# Patient Record
Sex: MALE | Race: WHITE | URBAN - METROPOLITAN AREA
[De-identification: names, ages, dates, MRNs, and addresses within clinical notes are randomized per-mention and may not be internally consistent; named-entity substitution may affect disease eponyms.]

---

## 2018-05-08 ENCOUNTER — OFFICE VISIT (OUTPATIENT)
Dept: UROLOGY | Facility: CLINIC | Age: 28
End: 2018-05-08
Payer: COMMERCIAL

## 2018-05-08 VITALS
HEART RATE: 84 BPM | WEIGHT: 240 LBS | BODY MASS INDEX: 33.6 KG/M2 | HEIGHT: 71 IN | SYSTOLIC BLOOD PRESSURE: 125 MMHG | DIASTOLIC BLOOD PRESSURE: 84 MMHG

## 2018-05-08 DIAGNOSIS — R39.12 WEAK URINE STREAM: Primary | ICD-10-CM

## 2018-05-08 DIAGNOSIS — K40.91 UNILATERAL RECURRENT INGUINAL HERNIA WITHOUT OBSTRUCTION OR GANGRENE: ICD-10-CM

## 2018-05-08 LAB
ALBUMIN UR-MCNC: NEGATIVE MG/DL
APPEARANCE UR: CLEAR
BILIRUB UR QL STRIP: NEGATIVE
COLOR UR AUTO: YELLOW
GLUCOSE UR STRIP-MCNC: NEGATIVE MG/DL
HGB UR QL STRIP: NEGATIVE
KETONES UR STRIP-MCNC: ABNORMAL MG/DL
LEUKOCYTE ESTERASE UR QL STRIP: NEGATIVE
NITRATE UR QL: NEGATIVE
PH UR STRIP: 5.5 PH (ref 5–7)
SOURCE: ABNORMAL
SP GR UR STRIP: 1.02 (ref 1–1.03)
UROBILINOGEN UR STRIP-ACNC: 0.2 EU/DL (ref 0.2–1)

## 2018-05-08 PROCEDURE — 81003 URINALYSIS AUTO W/O SCOPE: CPT | Performed by: UROLOGY

## 2018-05-08 PROCEDURE — 99203 OFFICE O/P NEW LOW 30 MIN: CPT | Performed by: UROLOGY

## 2018-05-08 RX ORDER — CIPROFLOXACIN 500 MG/1
500 TABLET, FILM COATED ORAL 2 TIMES DAILY
Qty: 60 TABLET | Refills: 0 | Status: SHIPPED | OUTPATIENT
Start: 2018-05-08 | End: 2018-07-30

## 2018-05-08 ASSESSMENT — PAIN SCALES - GENERAL: PAINLEVEL: MILD PAIN (3)

## 2018-05-08 NOTE — LETTER
"5/8/2018       RE: Adrián Mota  [address]  [facility] [address]     Dear Colleague,    Thank you for referring your patient, Adrián Mota, to the [facility] [place] at [facility]. Please see a copy of my visit note below.    [facility]  Main Office: [address]  [address]  [place] [address]       CHIEF COMPLAINT:  Slow urinary stream    HISTORY:   This is a healthy 27-year-old gentleman who is here today with complaints of a slow urinary stream. He says that this is been going on for about 2 years. He went to a clinic on higher loss about 1 year ago and was treated with ciprofloxacin and that actually did improve his urinary stream, but now the stream is back to being very weak. He says that the stream is weak every time he goes to the bathroom. He has no frequency or urgency or nocturia. He has no dysuria. No other symptoms. He also asked me about a \"bulge\" above the left testicle that is nonpainful but he has been noticing recently.      PAST MEDICAL HISTORY: No past medical history on file.    PAST SURGICAL HISTORY: No past surgical history on file.    FAMILY HISTORY: No family history on file.    SOCIAL HISTORY:   Social History   Substance Use Topics     Smoking status: Never Smoker     Smokeless tobacco: Never Used     Alcohol use Not on file        No Known Allergies    No current outpatient prescriptions on file.    Review Of Systems:  Skin: negative  Eyes: negative  Ears/Nose/Throat: negative  Respiratory: No shortness of breath, dyspnea on exertion, cough, or hemoptysis  Cardiovascular: negative  Gastrointestinal: negative  Genitourinary: negative  Musculoskeletal: negative  Neurologic: negative  Psychiatric: negative  Hematologic/Lymphatic/Immunologic: negative  Endocrine: negative      PHYSICAL EXAM:    /84  Pulse 84  Ht 1.803 m (5' 11\")  Wt 108.9 kg (240 lb)  BMI 33.47 kg/m2  General " appearance: In NAD, conversant  HEENT: Normocephalic and atraumatic, anicteric sclera  Cardiovascular: Not examined  Respiratory: normal, non-labored breathing  Gastrointestinal: negative, Abdomen soft, non-tender, and non-distended.   Musculoskeletal: Not Examined  Peripheral Vascular/extremity: No peripheral edema  Skin: Normal temperature, turgor, and texture. No rash  Psychiatric: Appropriate affect, alert and oriented to person, place, and time    Penis: Normal uncircumcised  Scrotal skin: Normal, no lesions  Testicles: Normal to palpation bilaterally. No intratesticular masses bilaterally.  Epididymis: Normal to palpation bilaterally  Initially when he was standing up he had a bulge above the left testicle. On initial exam this felt like a cord lipoma. However, I had him lay down and the bulge went away and in a supine position the spermatic cord is normal. This leads me to think this may be an inguinal hernia  Lymphatic: Normal inguinal lymph nodes  Digital Rectal Exam: I can only palpate the apex of the prostate due to body habitus    Cystoscopy: Not done      PSA:     UA RESULTS:  No results for input(s): COLOR, APPEARANCE, URINEGLC, URINEBILI, URINEKETONE, SG, UBLD, URINEPH, PROTEIN, UROBILINOGEN, NITRITE, LEUKEST, RBCU, WBCU in the last 17732 hours.    Bladder Scan: 42mL postvoid    Other Labs:      Imaging Studies: None      CLINICAL IMPRESSION:   Slow urinary stream, possible left inguinal hernia    PLAN:   We discussed potential etiologies for slow urinary stream. Urethral stricture versus prostatitis is the most likely diagnosis. Previously he improved with a course of ciprofloxacin so I recommended we try that again. I gave him a 30 day course of ciprofloxacin. I will see him back at the end of the course of antibiotic. If there is no improvement at the end of that course we will proceed with a cystoscopy at next visit for further evaluation.    As mentioned in my exam above, there is a possible left  inguinal hernia and he will follow up with general surgery regarding this.      Tony Sommers MD      Again, thank you for allowing me to participate in the care of your patient.      Sincerely,    Tony Sommers MD

## 2018-05-08 NOTE — PROGRESS NOTES
"[facility]  Main Office: [address]  [address]  [place] [address]       CHIEF COMPLAINT:  Slow urinary stream    HISTORY:   This is a healthy 27-year-old gentleman who is here today with complaints of a slow urinary stream. He says that this is been going on for about 2 years. He went to a clinic on higher loss about 1 year ago and was treated with ciprofloxacin and that actually did improve his urinary stream, but now the stream is back to being very weak. He says that the stream is weak every time he goes to the bathroom. He has no frequency or urgency or nocturia. He has no dysuria. No other symptoms. He also asked me about a \"bulge\" above the left testicle that is nonpainful but he has been noticing recently.      PAST MEDICAL HISTORY: No past medical history on file.    PAST SURGICAL HISTORY: No past surgical history on file.    FAMILY HISTORY: No family history on file.    SOCIAL HISTORY:   Social History   Substance Use Topics     Smoking status: Never Smoker     Smokeless tobacco: Never Used     Alcohol use Not on file        No Known Allergies    No current outpatient prescriptions on file.    Review Of Systems:  Skin: negative  Eyes: negative  Ears/Nose/Throat: negative  Respiratory: No shortness of breath, dyspnea on exertion, cough, or hemoptysis  Cardiovascular: negative  Gastrointestinal: negative  Genitourinary: negative  Musculoskeletal: negative  Neurologic: negative  Psychiatric: negative  Hematologic/Lymphatic/Immunologic: negative  Endocrine: negative      PHYSICAL EXAM:    /84  Pulse 84  Ht 1.803 m (5' 11\")  Wt 108.9 kg (240 lb)  BMI 33.47 kg/m2  General appearance: In NAD, conversant  HEENT: Normocephalic and atraumatic, anicteric sclera  Cardiovascular: Not examined  Respiratory: normal, non-labored breathing  Gastrointestinal: negative, Abdomen soft, non-tender, and non-distended.   Musculoskeletal: Not Examined  Peripheral Vascular/extremity: No peripheral " edema  Skin: Normal temperature, turgor, and texture. No rash  Psychiatric: Appropriate affect, alert and oriented to person, place, and time    Penis: Normal uncircumcised  Scrotal skin: Normal, no lesions  Testicles: Normal to palpation bilaterally. No intratesticular masses bilaterally.  Epididymis: Normal to palpation bilaterally  Initially when he was standing up he had a bulge above the left testicle. On initial exam this felt like a cord lipoma. However, I had him lay down and the bulge went away and in a supine position the spermatic cord is normal. This leads me to think this may be an inguinal hernia  Lymphatic: Normal inguinal lymph nodes  Digital Rectal Exam: I can only palpate the apex of the prostate due to body habitus    Cystoscopy: Not done      PSA:     UA RESULTS:  No results for input(s): COLOR, APPEARANCE, URINEGLC, URINEBILI, URINEKETONE, SG, UBLD, URINEPH, PROTEIN, UROBILINOGEN, NITRITE, LEUKEST, RBCU, WBCU in the last 81195 hours.    Bladder Scan: 42mL postvoid    Other Labs:      Imaging Studies: None      CLINICAL IMPRESSION:   Slow urinary stream, possible left inguinal hernia    PLAN:   We discussed potential etiologies for slow urinary stream. Urethral stricture versus prostatitis is the most likely diagnosis. Previously he improved with a course of ciprofloxacin so I recommended we try that again. I gave him a 30 day course of ciprofloxacin. I will see him back at the end of the course of antibiotic. If there is no improvement at the end of that course we will proceed with a cystoscopy at next visit for further evaluation.    As mentioned in my exam above, there is a possible left inguinal hernia and he will follow up with general surgery regarding this.      Tony Sommers MD

## 2018-05-08 NOTE — LETTER
Date:May 9, 2018      Patient was self referred, no letter generated. Do not send.        [facility] Health Information

## 2018-05-08 NOTE — MR AVS SNAPSHOT
"              After Visit Summary   5/8/2018    Adrián Mota    MRN: [id number]           Patient Information     Date Of Birth          1990        Visit Information        Provider Department      5/8/2018 9:30 AM Tony Sommers MD [facility] Urology [facility]        Today's Diagnoses     Weak urine stream    -  1    Unilateral recurrent inguinal hernia without obstruction or gangrene           Follow-ups after your visit        Follow-up notes from your care team     Return in about 1 month (around 6/8/2018) for possible cysto, also needs referral to surgical consultants re:hernia.      Your next 10 appointments already scheduled     Jun 12, 2018 10:00 AM CDT   Cystoscopy with Tony Sommers MD, UA CYF   [facility] Urology [facility] [place]    [address]  [address]  [facility] [address] [phone]              Who to contact     If you have questions or need follow up information about today's clinic visit or your schedule please contact [facility] directly at [phone].  Normal or non-critical lab and imaging results will be communicated to you by HubCasthart, letter or phone within 4 business days after the clinic has received the results. If you do not hear from us within 7 days, please contact the clinic through HubCasthart or phone. If you have a critical or abnormal lab result, we will notify you by phone as soon as possible.  Submit refill requests through Dodreams or call your pharmacy and they will forward the refill request to us. Please allow 3 business days for your refill to be completed.          Additional Information About Your Visit        MyChart Information     Dodreams lets you send messages to your doctor, view your test results, renew your prescriptions, schedule appointments and more. To sign up, go to [email or web address] . Click on \"Log in\" on the left " "side of the screen, which will take you to the Welcome page. Then click on \"Sign up Now\" on the right side of the page.     You will be asked to enter the access code listed below, as well as some personal information. Please follow the directions to create your username and password.     Your access code is: [email or web address]  Expires: 2018 10:20 AM     Your access code will  in 90 days. If you need help or a new code, please call your [facility] or [phone].        Care EveryWhere ID     This is your Care EveryWhere ID. This could be used by other organizations to access your [place] medical records  [id number]        Your Vitals Were     Pulse Height BMI (Body Mass Index)             84 1.803 m (5' 11\") 33.47 kg/m2          Blood Pressure from Last 3 Encounters:   18 125/84    Weight from Last 3 Encounters:   18 108.9 kg (240 lb)              We Performed the Following     UA without Microscopic [[id number]]        Primary Care Provider Fax #    Physician No Ref-Primary [phone]       No address on file        Equal Access to Services     [facility] : Hadii silverio stephenso Solula, waaxda lusolangeadaha, qaybta kaalmada adequinn, louann mathur . So [facility] [phone].    ATENCIÓN: Si habla español, tiene a seals disposición servicios gratuitos de asistencia lingüística. Sarthakame al [phone].    We comply with applicable federal civil rights laws and [place] laws. We do not discriminate on the basis of race, color, national origin, age, disability, sex, sexual orientation, or gender identity.            Thank you!     Thank you for choosing [facility] [place]  for your care. Our goal is always to provide you with excellent care. Hearing back from our patients is one way we can continue to improve our services. Please take a few minutes to complete the written survey that you may receive in the mail after your visit with us. Thank " you!             Your Updated Medication List - Protect others around you: Learn how to safely use, store and throw away your medicines at www.disposemymeds.org.      Notice  As of 5/8/2018 10:20 AM    You have not been prescribed any medications.

## 2018-06-12 ENCOUNTER — OFFICE VISIT (OUTPATIENT)
Dept: UROLOGY | Facility: CLINIC | Age: 28
End: 2018-06-12
Payer: COMMERCIAL

## 2018-06-12 VITALS
BODY MASS INDEX: 33.6 KG/M2 | DIASTOLIC BLOOD PRESSURE: 84 MMHG | WEIGHT: 240 LBS | SYSTOLIC BLOOD PRESSURE: 142 MMHG | HEIGHT: 71 IN

## 2018-06-12 DIAGNOSIS — R39.198 SLOWING OF URINARY STREAM: Primary | ICD-10-CM

## 2018-06-12 DIAGNOSIS — N39.43 POST-VOID DRIBBLING: Primary | ICD-10-CM

## 2018-06-12 DIAGNOSIS — R39.198 SLOWING OF URINARY STREAM: ICD-10-CM

## 2018-06-12 LAB
ALBUMIN UR-MCNC: ABNORMAL MG/DL
APPEARANCE UR: CLEAR
BILIRUB UR QL STRIP: NEGATIVE
COLOR UR AUTO: YELLOW
GLUCOSE UR STRIP-MCNC: NEGATIVE MG/DL
HGB UR QL STRIP: NEGATIVE
KETONES UR STRIP-MCNC: NEGATIVE MG/DL
LEUKOCYTE ESTERASE UR QL STRIP: NEGATIVE
NITRATE UR QL: NEGATIVE
PH UR STRIP: 6 PH (ref 5–7)
SOURCE: ABNORMAL
SP GR UR STRIP: >1.03 (ref 1–1.03)
UROBILINOGEN UR STRIP-ACNC: 0.2 EU/DL (ref 0.2–1)

## 2018-06-12 PROCEDURE — 52000 CYSTOURETHROSCOPY: CPT | Performed by: UROLOGY

## 2018-06-12 PROCEDURE — 81003 URINALYSIS AUTO W/O SCOPE: CPT | Performed by: UROLOGY

## 2018-06-12 ASSESSMENT — PAIN SCALES - GENERAL: PAINLEVEL: NO PAIN (0)

## 2018-06-12 NOTE — NURSING NOTE
Invasive Procedure Safety Checklist:    Procedure:     Action: Complete sections and checkboxes as appropriate.    Pre-procedure:  1. Patient ID Verified with 2 identifiers (Tia and  or MRN) : YES    2. Procedure and site verified with patient/designee (when able) : YES    3. Accurate consent documentation in medical record : YES    4. H&P (or appropriate assessment) documented in medical record : YES  H&P must be up to 30 days prior to procedure an updated within 24 hours of                 Procedure as applicable.     5. Relevant diagnostic and radiology test results appropriately labeled and displayed as applicable : YES    6. Blood products, implants, devices, and/or special equipment available for the procedure as applicable : YES    7. Procedure site(s) marked with provider initials [Exclusions: N/A] : NO    8. Marking not required. Reason : Yes  Procedure does not require site marking    Time Out:     Time-Out performed immediately prior to starting procedure, including verbal and active participation of all team members addressing: YES    1. Correct patient identity.  2. Confirmed that the correct side and site are marked.  3. An accurate procedure to be done.  4. Agreement on the procedure to be done.  5. Correct patient position.  6. Relevant images and results are properly labeled and appropriately displayed.  7. The need to administer antibiotics or fluids for irrigation purposes during the procedure as applicable.  8. Safety precautions based on patient history or medication use.    During Procedure: Verification of correct person, site, and procedure occurs any time the responsibility for care of the patient is transferred to another member of the care team.    The following medication was given:     MEDICATION: Uro-jet  ROUTE: Urethral meatus   SITE: Urethra   DOSE: 20 mL  LOT #: [id number]  :  International medication systems, limited   EXPIRATION DATE:    NDC#: [address]-9

## 2018-06-12 NOTE — PROGRESS NOTES
"Office Visit Note  [facility]  [phone]    UROLOGIC DIAGNOSES:   Terminal dribbling    CURRENT INTERVENTIONS:       HISTORY:   Adrián returns to clinic today for follow-up. He reports no change in his symptoms from the ciprofloxacin. I asked him about his symptoms again today and he reports mainly terminal dribbling. He says he occasionally has a sensation of incomplete emptying. I discussed with him the fact that some terminal dribbling is normal in men. I think that a cystoscopy would be optional at this point. He would like to proceed with a cystoscopy today so we prepped him for this today.      PAST MEDICAL HISTORY: No past medical history on file.    PAST SURGICAL HISTORY: No past surgical history on file.    FAMILY HISTORY: No family history on file.    SOCIAL HISTORY:   Social History   Substance Use Topics     Smoking status: Never Smoker     Smokeless tobacco: Never Used     Alcohol use Not on file       Current Outpatient Prescriptions   Medication     ciprofloxacin (CIPRO) 500 MG tablet     No current facility-administered medications for this visit.          PHYSICAL EXAM:    /84  Ht 1.803 m (5' 11\")  Wt 108.9 kg (240 lb)  BMI 33.47 kg/m2    HEENT: Normocephalic and atraumatic   Cardiac: Not done  Back/Flank: Not done  CNS/PNS: Not done  Respiratory: Normal non-labored breathing  Abdomen: Soft nontender and nondistended  Peripheral Vascular: Not done  Mental Status: Not done    Penis: Not done  Scrotal Skin: Not done  Testicles: Not done  Epididymis: Not done  Digital Rectal Exam:     Cystoscopy: I performed flexible cystoscopy today and bladder and urethra were normal throughout. No tumors identified throughout the bladder. The prostate was small and nonobstructing. The urethra was normal throughout with no strictures.    Imaging: None    Urinalysis: UA RESULTS:  Recent Labs   Lab Test  05/08/18   1000   COLOR  Yellow   APPEARANCE  Clear   URINEGLC  Negative   URINEBILI  " Negative   URINEKETONE  Trace*   SG  1.025   UBLD  Negative   URINEPH  5.5   PROTEIN  Negative   UROBILINOGEN  0.2   NITRITE  Negative   LEUKEST  Negative       PSA:     Post Void Residual:     Other labs: None today      IMPRESSION:  Normal urologic evaluation    PLAN:  His cystoscopy was normal today. No strictures or other causes of urinary symptoms were discovered. He was provided reassurance. He was counseled that some degree of terminal dribbling is normal at the end of a urinary stream. He will follow-up with me as needed in the future.    Total Time: 15 minutes                                      Total in Consultation: 10 minutes      Tony Sommers M.D.

## 2018-06-12 NOTE — MR AVS SNAPSHOT
"              After Visit Summary   6/12/2018    Adrián Mota    MRN: [id number]           Patient Information     Date Of Birth          1990        Visit Information        Provider Department      6/12/2018 10:00 AM Tony Sommers MD; UA [facility] [place]        Today's Diagnoses     Post-void dribbling    -  1      Care Instructions         AFTER YOUR CYSTOSCOPY         You have just completed a cystoscopy, or \"cysto\", which allowed your physician to learn more about your bladder (or to remove a stent placed after surgery). We suggest that you continue to avoid caffeine, fruit juice, and alcohol for the next 24 hours, however, you are encouraged to return to your normal activities.       A few things that are considered normal after your cystoscopy:    * small amount of bleeding (or spotting) that clears within the next 24 hours    * slight burning sensation with urination    * sensation to of needing to avoid more frequently    * the feeling of \"air\" in your urine    * mild discomfort that is relieved with Tylonol        Please contact our office promptly if you:    * develop a fever above 101 degrees    * are unable to urinate    * develop bright red blood that does not stop    * severe pain or swelling        And of course, please contact our office with any concerns or questions [phone]                Follow-ups after your visit        Follow-up notes from your care team     Return if symptoms worsen or fail to improve.      Future tests that were ordered for you today     Open Future Orders        Priority Expected Expires Ordered    UA without Microscopic Routine  6/12/2019 6/12/2018            Who to contact     If you have questions or need follow up information about today's clinic visit or your schedule please contact [facility] [place] directly at [phone].  Normal or non-critical lab and imaging results " "will be communicated to you by MyChart, letter or phone within 4 business days after the clinic has received the results. If you do not hear from us within 7 days, please contact the clinic through Ticket ABC or phone. If you have a critical or abnormal lab result, we will notify you by phone as soon as possible.  Submit refill requests through Ticket ABC or call your pharmacy and they will forward the refill request to us. Please allow 3 business days for your refill to be completed.          Additional Information About Your Visit        Ticket ABC Information     Ticket ABC lets you send messages to your doctor, view your test results, renew your prescriptions, schedule appointments and more. To sign up, go to [email or web address] . Click on \"Log in\" on the left side of the screen, which will take you to the Welcome page. Then click on \"Sign up Now\" on the right side of the page.     You will be asked to enter the access code listed below, as well as some personal information. Please follow the directions to create your username and password.     Your access code is: [email or web address]  Expires: 2018 10:20 AM     Your access code will  in 90 days. If you need help or a new code, please call your [place] clinic or [phone].        Care EveryWhere ID     This is your Care EveryWhere ID. This could be used by other organizations to access your [place] medical records  [id number]        Your Vitals Were     Height BMI (Body Mass Index)                1.803 m (5' 11\") 33.47 kg/m2           Blood Pressure from Last 3 Encounters:   18 142/84   18 125/84    Weight from Last 3 Encounters:   18 108.9 kg (240 lb)   18 108.9 kg (240 lb)              Today, you had the following     No orders found for display       Primary Care Provider Fax #    Physician No Ref-Primary [phone]       No address on file        Equal Access to Services     DOREEN MONTES : Hadii phylicia Rivera " tabatha ferrermaoz cameronanabellabbie clintoninna singhin hayaan adeeg kharash la'aan ah. So [facility] [phone].    ATENCIÓN: Si gerardo gruber, tiene a seals disposición servicios gratuitos de asistencia lingüística. Llame al [phone].    We comply with applicable federal civil rights laws and [place] laws. We do not discriminate on the basis of race, color, national origin, age, disability, sex, sexual orientation, or gender identity.            Thank you!     Thank you for choosing [facility] [place]  for your care. Our goal is always to provide you with excellent care. Hearing back from our patients is one way we can continue to improve our services. Please take a few minutes to complete the written survey that you may receive in the mail after your visit with us. Thank you!             Your Updated Medication List - Protect others around you: Learn how to safely use, store and throw away your medicines at www.disposemymeds.org.          This list is accurate as of 6/12/18 10:51 AM.  Always use your most recent med list.                   Brand Name Dispense Instructions for use Diagnosis    ciprofloxacin 500 MG tablet    CIPRO    60 tablet    Take 1 tablet (500 mg) by mouth 2 times daily    Weak urine stream

## 2018-06-12 NOTE — PATIENT INSTRUCTIONS
"     AFTER YOUR CYSTOSCOPY         You have just completed a cystoscopy, or \"cysto\", which allowed your physician to learn more about your bladder (or to remove a stent placed after surgery). We suggest that you continue to avoid caffeine, fruit juice, and alcohol for the next 24 hours, however, you are encouraged to return to your normal activities.       A few things that are considered normal after your cystoscopy:    * small amount of bleeding (or spotting) that clears within the next 24 hours    * slight burning sensation with urination    * sensation to of needing to avoid more frequently    * the feeling of \"air\" in your urine    * mild discomfort that is relieved with Tylonol        Please contact our office promptly if you:    * develop a fever above 101 degrees    * are unable to urinate    * develop bright red blood that does not stop    * severe pain or swelling        And of course, please contact our office with any concerns or questions [phone]        "

## 2018-06-12 NOTE — LETTER
"6/12/2018       RE: Adrián Mota  [address]  [facility] [address]     Dear Colleague,    Thank you for referring your patient, Adrián Mota, to the [facility] at [facility]. Please see a copy of my visit note below.    Office Visit Note  [facility]  [phone]    UROLOGIC DIAGNOSES:   Terminal dribbling    CURRENT INTERVENTIONS:       HISTORY:   Adrián returns to clinic today for follow-up. He reports no change in his symptoms from the ciprofloxacin. I asked him about his symptoms again today and he reports mainly terminal dribbling. He says he occasionally has a sensation of incomplete emptying. I discussed with him the fact that some terminal dribbling is normal in men. I think that a cystoscopy would be optional at this point. He would like to proceed with a cystoscopy today so we prepped him for this today.      PAST MEDICAL HISTORY: No past medical history on file.    PAST SURGICAL HISTORY: No past surgical history on file.    FAMILY HISTORY: No family history on file.    SOCIAL HISTORY:   Social History   Substance Use Topics     Smoking status: Never Smoker     Smokeless tobacco: Never Used     Alcohol use Not on file       Current Outpatient Prescriptions   Medication     ciprofloxacin (CIPRO) 500 MG tablet     No current facility-administered medications for this visit.          PHYSICAL EXAM:    /84  Ht 1.803 m (5' 11\")  Wt 108.9 kg (240 lb)  BMI 33.47 kg/m2    HEENT: Normocephalic and atraumatic   Cardiac: Not done  Back/Flank: Not done  CNS/PNS: Not done  Respiratory: Normal non-labored breathing  Abdomen: Soft nontender and nondistended  Peripheral Vascular: Not done  Mental Status: Not done    Penis: Not done  Scrotal Skin: Not done  Testicles: Not done  Epididymis: Not done  Digital Rectal Exam:     Cystoscopy: I performed flexible cystoscopy today and bladder and urethra were normal " throughout. No tumors identified throughout the bladder. The prostate was small and nonobstructing. The urethra was normal throughout with no strictures.    Imaging: None    Urinalysis: UA RESULTS:  Recent Labs   Lab Test  05/08/18   1000   COLOR  Yellow   APPEARANCE  Clear   URINEGLC  Negative   URINEBILI  Negative   URINEKETONE  Trace*   SG  1.025   UBLD  Negative   URINEPH  5.5   PROTEIN  Negative   UROBILINOGEN  0.2   NITRITE  Negative   LEUKEST  Negative       PSA:     Post Void Residual:     Other labs: None today      IMPRESSION:  Normal urologic evaluation    PLAN:  His cystoscopy was normal today. No strictures or other causes of urinary symptoms were discovered. He was provided reassurance. He was counseled that some degree of terminal dribbling is normal at the end of a urinary stream. He will follow-up with me as needed in the future.    Total Time: 15 minutes                                      Total in Consultation: 10 minutes      Tony Sommers M.D.              Again, thank you for allowing me to participate in the care of your patient.      Sincerely,    Tony Sommers MD

## 2018-07-30 ENCOUNTER — OFFICE VISIT (OUTPATIENT)
Dept: SURGERY | Facility: CLINIC | Age: 28
End: 2018-07-30
Payer: COMMERCIAL

## 2018-07-30 VITALS
WEIGHT: 240 LBS | HEART RATE: 82 BPM | DIASTOLIC BLOOD PRESSURE: 100 MMHG | HEIGHT: 71 IN | SYSTOLIC BLOOD PRESSURE: 148 MMHG | BODY MASS INDEX: 33.6 KG/M2

## 2018-07-30 DIAGNOSIS — R10.32 LEFT GROIN PAIN: Primary | ICD-10-CM

## 2018-07-30 PROCEDURE — 99203 OFFICE O/P NEW LOW 30 MIN: CPT | Performed by: SURGERY

## 2018-07-30 NOTE — LETTER
"2018    Re: Adrián Mota - 1990    HISTORY OF PRESENT ILLNESS:  Adrián Mota is a 28 year old male who is seen in consultation at the request of Dr. Sommers for evaluation of left groin pain.  Some time over 6 months ago while doing some heavy/strenuous activity Mr. Mota felt some discomfort on his left groin.  No gastrointestinal difficulties were related to it.  Now he does not have particular pain but the left groin feels sore and full after sitting for extended periods of time.  No previous groin procedures.     REVIEW OF SYSTEMS:  Constitutional:  Negative for chills, fatigue, fever and weight change.  Neuro: No extremity, nor facial weakness  Psych:  No unexpected changes in mood  Eyes:  Negative for new vision problems.  ENT:  Negative for ENT pain.  Cardiovascular:  Negative for chest pain, palpitations.  Respiratory:  Negative for cough, dyspnea.  Gastrointestinal:  Negative for abdominal pain.    Musculoskeletal:  Negative for new arthralgias or myalgias.  Integumentary:  No skin changes or rashes, left scrotal fullness      No past medical history.     No past surgical history.      Family History has been reviewed.      Vitals: BP (!) 148/100  Pulse 82  Ht 5' 11\" (1.803 m)  Wt 240 lb (108.9 kg)  BMI 33.47 kg/m2  BMI= Body mass index is 33.47 kg/(m^2).     EXAM:  GENERAL: healthy, alert and no distress      HEENT: moist mucus membranes, no scleral icterus   CARDIOVASCULAR:  RRR, No JVD  NEURO:  Alert;  well oriented to time, place and person.  RESPIRATORY: non labored breathing  NECK: Neck supple. No noticeable masses.  No lymphadenopathy noted.  ABDOMEN/GI: soft, nontender, nondistended, within the limitations of his body habitus I do not feel any hernia on either groin.  I can appreciate the fullness he refers to on the lateral aspect of the left scrotum.  This feels soft and compressible. Not particularly tender.  EXTREMITIES: warm and well perfused, no edema  SKIN: No " suspicious lesions or rashes  LYMPH: Normal inguinal lymph nodes     LABS/Imaging: none at this time     ASSESSMENT:  Adrián Mota suffers from 1. Left groin pain     - US Extremity Non Vascular Left; Future  - US Testicular & Scrotum w Doppler Ltd; Future      PLAN:  Given the lack of physical findings which could be due to his body habitus we discussed the option of imaging studies versus observation versus laparoscopic exploration.  We will start with ultrasound evaluation of his left groin and scrotum.  Pending results and or signs and symptoms evolution laparoscopic exploration and or repair could be undertaken.     He understands and agrees with this plan.     It is my pleasure to participate in the care of Adrián Mota. Thank you for this consultation.      If you have any questions please give me a call.     Best regards,  Anatoly Meyers MD

## 2018-07-30 NOTE — PATIENT INSTRUCTIONS
Patient to follow up with Primary Care provider regarding elevated blood pressure.    Your left groin and testicular ultrasound are scheduled for 7/31/18 2:40 pm at [facility]

## 2018-07-30 NOTE — MR AVS SNAPSHOT
After Visit Summary   7/30/2018    Adrián Mota    MRN: [id number]           Patient Information     Date Of Birth          1990        Visit Information        Provider Department      7/30/2018 12:30 PM Anatoly Meyers MD [facility] General Surgery      Today's Diagnoses     Left groin pain    -  1      Care Instructions    Patient to follow up with Primary Care provider regarding elevated blood pressure.    Your left groin and testicular ultrasound are scheduled for 7/31/18 2:40 pm at [facility]          Follow-ups after your visit        Your next 10 appointments already scheduled     Jul 31, 2018  2:40 PM CDT   US TESTICULAR AND SCROTUM WITH DOPPLER LIMITED with SHUS2   [facility] ([facility])    [address] [place] [id number] [phone]           Please bring a list of your medicines (including vitamins, minerals and over-the-counter drugs). Also, tell your doctor about any allergies you may have. Wear comfortable clothes and leave your valuables at home.  You do not need to do anything special to prepare for your exam.  Please call the Imaging Department at your exam site with any questions.            Jul 31, 2018  3:10 PM CDT   US EXTREMITY NON VASCULAR LEFT with SHUS2   [facility] ([facility])    [facility] [facility] [id number] [phone]           Please bring a list of your medicines (including vitamins, minerals and over-the-counter drugs). Also, tell your doctor about any allergies you may have. Wear comfortable clothes and leave your valuables at home.  You do not need to do anything special to prepare for your exam.  Please call the Imaging Department at your exam site with any questions.              Future tests that were ordered for you today     Open Future Orders        Priority Expected Expires Ordered     "US Extremity Non Vascular Left Routine 2018    US Testicular & Scrotum w Doppler Ltd Routine 2018            Who to contact     If you have questions or need follow up information about today's clinic visit or your schedule please contact [facility] directly at [phone].  Normal or non-critical lab and imaging results will be communicated to you by Zeetlhart, letter or phone within 4 business days after the clinic has received the results. If you do not hear from us within 7 days, please contact the clinic through Zeetlhart or phone. If you have a critical or abnormal lab result, we will notify you by phone as soon as possible.  Submit refill requests through Healthiest You or call your pharmacy and they will forward the refill request to us. Please allow 3 business days for your refill to be completed.          Additional Information About Your Visit        ZeetlharNVMdurance Information     Healthiest You lets you send messages to your doctor, view your test results, renew your prescriptions, schedule appointments and more. To sign up, go to [email or web address] . Click on \"Log in\" on the left side of the screen, which will take you to the Welcome page. Then click on \"Sign up Now\" on the right side of the page.     You will be asked to enter the access code listed below, as well as some personal information. Please follow the directions to create your username and password.     Your access code is: [email or web address]  Expires: 2018 10:20 AM     Your access code will  in 90 days. If you need help or a new code, please call your [facility] clinic or [phone].        Care EveryWhere ID     This is your Care EveryWhere ID. This could be used by other organizations to access your [facility] medical records  [id number]        Your Vitals Were     Pulse Height BMI (Body Mass Index)             82 5' 11\" (1.803 m) 33.47 kg/m2          Blood Pressure from Last 3 Encounters: "   07/30/18 (!) 148/100   06/12/18 142/84   05/08/18 125/84    Weight from Last 3 Encounters:   07/30/18 240 lb (108.9 kg)   06/12/18 240 lb (108.9 kg)   05/08/18 240 lb (108.9 kg)                 Today's Medication Changes          These changes are accurate as of 7/30/18 12:56 PM.  If you have any questions, ask your nurse or doctor.               Stop taking these medicines if you haven't already. Please contact your care team if you have questions.     ciprofloxacin 500 MG tablet   Commonly known as:  CIPRO   Stopped by:  Anatoly Meyers MD                    Primary Care Provider Fax #    Physician No Ref-Primary [phone]       No address on file        Equal Access to Services     DOREEN MONTES : Sameera Gilbert, wafernando ferrer, qaybta kaalmaoz lawton, louann chopra. So [facility] [phone].    ATENCIÓN: Si habla español, tiene a seals disposición servicios gratuitos de asistencia lingüística. Llame al [phone].    We comply with applicable federal civil rights laws and [place] laws. We do not discriminate on the basis of race, color, national origin, age, disability, sex, sexual orientation, or gender identity.            Thank you!     Thank you for choosing [facility]  for your care. Our goal is always to provide you with excellent care. Hearing back from our patients is one way we can continue to improve our services. Please take a few minutes to complete the written survey that you may receive in the mail after your visit with us. Thank you!             Your Updated Medication List - Protect others around you: Learn how to safely use, store and throw away your medicines at www.disposemymeds.org.      Notice  As of 7/30/2018 12:56 PM    You have not been prescribed any medications.

## 2018-07-31 ENCOUNTER — HOSPITAL ENCOUNTER (OUTPATIENT)
Dept: ULTRASOUND IMAGING | Facility: CLINIC | Age: 28
Discharge: HOME | End: 2018-07-31
Attending: SURGERY | Admitting: SURGERY

## 2018-07-31 DIAGNOSIS — R10.32 LEFT GROIN PAIN: ICD-10-CM

## 2018-07-31 PROCEDURE — 76870 US EXAM SCROTUM: CPT

## 2018-07-31 NOTE — PROGRESS NOTES
"[facility] General Surgery Clinic Consultation    CHIEF COMPLAINT:  Chief Complaint   Patient presents with     Consult     [facility]       HISTORY OF PRESENT ILLNESS:  Adrián Mota is a 28 year old male who is seen in consultation at the request of Dr. Sommers for evaluation of left groin pain.  Some time over 6 months ago while doing some heavy/strenuous activity Mr. Mota felt some discomfort on his left groin.  No gastrointestinal difficulties were related to it.  Now he does not have particular pain but the left groin feels sore and full after sitting for extended periods of time.  No previous groin procedures.    REVIEW OF SYSTEMS:  Constitutional:  Negative for chills, fatigue, fever and weight change.  Neuro: No extremity, nor facial weakness  Psych:  No unexpected changes in mood  Eyes:  Negative for new vision problems.  ENT:  Negative for ENT pain.  Cardiovascular:  Negative for chest pain, palpitations.  Respiratory:  Negative for cough, dyspnea.  Gastrointestinal:  Negative for abdominal pain.    Musculoskeletal:  Negative for new arthralgias or myalgias.  Integumentary:  No skin changes or rashes, left scrotal fullness     No past medical history.    No past surgical history.     Family History has been reviewed.    Social History   Substance Use Topics     Smoking status: Never Smoker     Smokeless tobacco: Never Used     Alcohol use Not on file       There is no problem list on file for this patient.      No Known Allergies    No current outpatient prescriptions on file.       Vitals: BP (!) 148/100  Pulse 82  Ht 5' 11\" (1.803 m)  Wt 240 lb (108.9 kg)  BMI 33.47 kg/m2  BMI= Body mass index is 33.47 kg/(m^2).    EXAM:  GENERAL: healthy, alert and no distress     HEENT: moist mucus membranes, no scleral icterus   CARDIOVASCULAR:  RRR, No JVD  NEURO:  Alert;  well oriented to time, place and person.  RESPIRATORY: non labored breathing  NECK: Neck supple. No noticeable masses.  No lymphadenopathy " noted.  ABDOMEN/GI: soft, nontender, nondistended, within the limitations of his body habitus I do not feel any hernia on either groin.  I can appreciate the fullness he refers to on the lateral aspect of the left scrotum.  This feels soft and compressible. Not particularly tender.  EXTREMITIES: warm and well perfused, no edema  SKIN: No suspicious lesions or rashes  LYMPH: Normal inguinal lymph nodes    LABS/Imaging: none at this time    ASSESSMENT:  Adrián Mota suffers from 1. Left groin pain    - US Extremity Non Vascular Left; Future  - US Testicular & Scrotum w Doppler Ltd; Future      PLAN:  Given the lack of physical findings which could be due to his body habitus we discussed the option of imaging studies versus observation versus laparoscopic exploration.  We will start with ultrasound evaluation of his left groin and scrotum.  Pending results and or signs and symptoms evolution laparoscopic exploration and or repair could be undertaken.    He understands and agrees with this plan.    It is my pleasure to participate in the care of Adrián Mota. Thank you for this consultation.     If you have any questions please give me a call.    Best regards,  Anatoly Meyers MD    Please route or send letter to:  Primary Care Provider (PCP), Referring Provider and Include Progress Note    Total time with patient visit: 30 minutes more than half spent in counseling, explanation of procedures and coordination of care.

## 2018-08-02 ENCOUNTER — TELEPHONE (OUTPATIENT)
Dept: SURGERY | Facility: CLINIC | Age: 28
End: 2018-08-02

## 2018-08-02 NOTE — TELEPHONE ENCOUNTER
Attempted to call patient to discuss U/S results.  No answer.  Left call back number for patient to call.    IMPRESSION:  1. Multiple left varicoceles noted at the site of patient's bulging.  No right varicoceles.  2. No hernias bilaterally.    Per LEL, patient should go back to Dr. Sommers (Urologist) as U/S shows varicoceles as discussed during appointment.    Michelle Burr RN

## 2018-08-02 NOTE — TELEPHONE ENCOUNTER
Returned patient's call and informed him that U/S shows varicoceles in his left testicle.  No evidence of hernia.  He should follow up with urologist.    Copy of Ultrasound report sent to patient.    He verbalized understanding and agreed.    Michelle Burr RN

## 2018-08-21 ENCOUNTER — PRE VISIT (OUTPATIENT)
Dept: UROLOGY | Facility: CLINIC | Age: 28
End: 2018-08-21

## 2018-08-21 NOTE — TELEPHONE ENCOUNTER
Patient with history of varicocele coming in for consult with Dr. Jaramillo. Patient chart reviewed, no need for call, all records available and ready for appointment.

## 2018-08-30 ENCOUNTER — OFFICE VISIT (OUTPATIENT)
Dept: UROLOGY | Facility: CLINIC | Age: 28
End: 2018-08-30
Payer: COMMERCIAL

## 2018-08-30 VITALS
DIASTOLIC BLOOD PRESSURE: 94 MMHG | HEART RATE: 84 BPM | HEIGHT: 71 IN | SYSTOLIC BLOOD PRESSURE: 154 MMHG | WEIGHT: 240 LBS | BODY MASS INDEX: 33.6 KG/M2

## 2018-08-30 DIAGNOSIS — N50.819 PAIN OF TESTES: ICD-10-CM

## 2018-08-30 DIAGNOSIS — I86.1 LEFT VARICOCELE: Primary | ICD-10-CM

## 2018-08-30 ASSESSMENT — PAIN SCALES - GENERAL: PAINLEVEL: NO PAIN (0)

## 2018-08-30 NOTE — MR AVS SNAPSHOT
"              After Visit Summary   8/30/2018    Adrián Mota    MRN: [id number]           Patient Information     Date Of Birth          1990        Visit Information        Provider Department      8/30/2018 10:40 AM Kirit Jaramillo MD [facility] Urology and [facility]        Today's Diagnoses     Left varicocele    -  1    Pain of testes           Follow-ups after your visit        Future tests that were ordered for you today     Open Future Orders        Priority Expected Expires Ordered    IR Consultation for IR Exam Routine  8/30/2019 8/30/2018            Who to contact     Please call your clinic at [phone] to:    Ask questions about your health    Make or cancel appointments    Discuss your medicines    Learn about your test results    Speak to your doctor            Additional Information About Your Visit        Care EveryWhere ID     This is your Care EveryWhere ID. This could be used by other organizations to access your [facility] medical records  [id number]        Your Vitals Were     Pulse Height BMI (Body Mass Index)             84 1.803 m (5' 11\") 33.47 kg/m2          Blood Pressure from Last 3 Encounters:   08/30/18 (!) 154/94   07/30/18 (!) 148/100   06/12/18 142/84    Weight from Last 3 Encounters:   08/30/18 108.9 kg (240 lb)   07/30/18 108.9 kg (240 lb)   06/12/18 108.9 kg (240 lb)               Primary Care Provider Fax #    Physician No Ref-Primary [phone]       No address on file        Equal Access to Services     CARIE [facility] : Hadii aad ku hadasho Soomaali, waaxda luqadaha, qaybta kaalmada adeegyada, louann canales hayelizabeth mathur . So [facility] [phone].    ATENCIÓN: Si habla español, tiene a seals disposición servicios gratuitos de asistencia lingüística. Llame al [phone].    We comply with applicable federal civil rights laws and [place] laws. We do not discriminate on the basis of race, color, national origin, age, disability, " sex, sexual orientation, or gender identity.            Thank you!     Thank you for choosing [facility] UROLOGY AND [facility] FOR PROSTATE AND UROLOGIC CANCERS  for your care. Our goal is always to provide you with excellent care. Hearing back from our patients is one way we can continue to improve our services. Please take a few minutes to complete the written survey that you may receive in the mail after your visit with us. Thank you!             Your Updated Medication List - Protect others around you: Learn how to safely use, store and throw away your medicines at www.disposemymeds.org.      Notice  As of 8/30/2018  2:12 PM    You have not been prescribed any medications.       none

## 2018-08-30 NOTE — LETTER
"8/30/2018       RE: Adrián Mota  [address]  [place] [address]     Dear Colleague,    Thank you for referring your patient, Adrián Mota, to the [facility] UROLOGY AND INST FOR PROSTATE AND UROLOGIC CANCERS at [facility]. Please see a copy of my visit note below.    I am seeing Adrián Mota in consultation from Anatoly Meyers for evaluation of left varicocele.    HPI:  Adrián Mota is a 28 year old male with left scrotal discomfort.  Prolonged sitting causes some discomfort.  No other exacerbating/relieving factors.  Has slight discomfort.    No children in the past.  Not trying for fertility right now.    REVIEW OF SYSTEMS:  General: negative  Skin: negative  Eyes: negative  Ears/Nose/Throat: negative  Respiratory: negative  Cardiovascular: negative  Gastrointestinal: negative  Genitourinary: see HPI  Musculoskeletal: negative  Neurologic: negative  Psychiatric: negative  Hematologic/Lymphatic/Immunologic: negative  Endocrine: negative    PAST MEDICAL HX:  No chronic medical problems     PAST SURG HX:  No history of surgery.     FAMILY HX:  PGM has DM.    SOCIAL HX:  Social History   Substance Use Topics     Smoking status: Never Smoker     Smokeless tobacco: Never Used     Alcohol use Not on file       MEDICATIONS:  No prescription medications     ALLERGIES:  Review of patient's allergies indicates no known allergies.      GENERAL PHYSICAL EXAM:     BP (!) 154/94  Pulse 84  Ht 1.803 m (5' 11\")  Wt 108.9 kg (240 lb)  BMI 33.47 kg/m2   Constitutional: No acute distress. Well nourished.   PSYCH: normal mood and affect.  NEURO: normal gait, no focal deficits.   EYES: anicteric, EOMI, PERR.  CARDIOPULMONARY: breathing non-labored, pulse regular rate/rhythm, no peripheral edema.  GI: Abdomen soft, non-tender, no surgical scars, overweight.  MUSCULOSKELETAL: normal limb proportions, no muscle wasting, no contractures.  SKIN: Normal virilized hair distribution, " no lesions, warts or rashes over genitalia, abdomen extremities or face.  HEME/LYMPH: no ecchymosis, no lymphadenopathy in groin, no lymphedema.     EXAM:  Phallus  circumcised, meatus adequate, no plaques palpated.   There is perhaps a skin bulge near the left groin crease/ upper scrotum but I think this is just adipose tissue/ skin.  Left testis descended , size is normal, consistency is normal . No intra-testicular masses.   Right testis descended , size is normal, consistency is normal . No intra-testicular masses.   Epididymes present, non-tender, not-enlarged.   Left cord: Vas present. Possible grade II  varicocele noted.  Right cord: Vas present. no varicocele noted.     Prostate exam: deferred     Imaging/labs:  Lab Results   Component Value Date    CR 0.89 01/27/2006     Scrotal ultrasound 7/31/18 showed left varicocele.      ASSESSMENT:     Left varicocele. Discussed etiology, repair options of microscopic open repair versus embolization.    He notes a lump in the left groin fold above the left testis- this looks like just redundant skin/adipose tissue.  I doubt this is from his varicocele.    PLAN:    IR referral for left varicocele embolization    PRN follow-up with urology.    Declines baseline fertility testing with hormone check and semen analysis.      Copied cc to Consulting provider Anatoly Meyers          Again, thank you for allowing me to participate in the care of your patient.      Sincerely,    Kirit Jaramillo MD

## 2018-08-30 NOTE — NURSING NOTE
"Chief Complaint   Patient presents with     Consult     history of varicocele coming in for consult with Dr. Jaramillo       Blood pressure (!) 154/94, pulse 84, height 1.803 m (5' 11\"), weight 108.9 kg (240 lb). Body mass index is 33.47 kg/(m^2).    There is no problem list on file for this patient.      No Known Allergies    No current outpatient prescriptions on file.       Social History   Substance Use Topics     Smoking status: Never Smoker     Smokeless tobacco: Never Used     Alcohol use Not on file       Dulce Maria Crawford LPN  8/30/2018  10:47 AM    "

## 2018-08-30 NOTE — PROGRESS NOTES
"I am seeing Adrián Mota in consultation from Anatoly Meyers for evaluation of left varicocele.    HPI:  Adrián Mota is a 28 year old male with left scrotal discomfort.  Prolonged sitting causes some discomfort.  No other exacerbating/relieving factors.  Has slight discomfort.    No children in the past.  Not trying for fertility right now.    REVIEW OF SYSTEMS:  General: negative  Skin: negative  Eyes: negative  Ears/Nose/Throat: negative  Respiratory: negative  Cardiovascular: negative  Gastrointestinal: negative  Genitourinary: see HPI  Musculoskeletal: negative  Neurologic: negative  Psychiatric: negative  Hematologic/Lymphatic/Immunologic: negative  Endocrine: negative    PAST MEDICAL HX:  No chronic medical problems     PAST SURG HX:  No history of surgery.     FAMILY HX:  PGM has DM.    SOCIAL HX:  Social History   Substance Use Topics     Smoking status: Never Smoker     Smokeless tobacco: Never Used     Alcohol use Not on file       MEDICATIONS:  No prescription medications     ALLERGIES:  Review of patient's allergies indicates no known allergies.      GENERAL PHYSICAL EXAM:     BP (!) 154/94  Pulse 84  Ht 1.803 m (5' 11\")  Wt 108.9 kg (240 lb)  BMI 33.47 kg/m2   Constitutional: No acute distress. Well nourished.   PSYCH: normal mood and affect.  NEURO: normal gait, no focal deficits.   EYES: anicteric, EOMI, PERR.  CARDIOPULMONARY: breathing non-labored, pulse regular rate/rhythm, no peripheral edema.  GI: Abdomen soft, non-tender, no surgical scars, overweight.  MUSCULOSKELETAL: normal limb proportions, no muscle wasting, no contractures.  SKIN: Normal virilized hair distribution, no lesions, warts or rashes over genitalia, abdomen extremities or face.  HEME/LYMPH: no ecchymosis, no lymphadenopathy in groin, no lymphedema.     EXAM:  Phallus  circumcised, meatus adequate, no plaques palpated.   There is perhaps a skin bulge near the left groin crease/ upper scrotum but I think this is just " adipose tissue/ skin.  Left testis descended , size is normal, consistency is normal . No intra-testicular masses.   Right testis descended , size is normal, consistency is normal . No intra-testicular masses.   Epididymes present, non-tender, not-enlarged.   Left cord: Vas present. Possible grade II  varicocele noted.  Right cord: Vas present. no varicocele noted.     Prostate exam: deferred     Imaging/labs:  Lab Results   Component Value Date    CR 0.89 01/27/2006     Scrotal ultrasound 7/31/18 showed left varicocele.      ASSESSMENT:     Left varicocele. Discussed etiology, repair options of microscopic open repair versus embolization.    He notes a lump in the left groin fold above the left testis- this looks like just redundant skin/adipose tissue.  I doubt this is from his varicocele.    PLAN:    IR referral for left varicocele embolization    PRN follow-up with urology.    Declines baseline fertility testing with hormone check and semen analysis.      Copied cc to Consulting provider Anatoly Meyers        Thank-you for the kind consultation.  Kirit Jaramillo MD     Urological Surgeon